# Patient Record
Sex: MALE | Race: WHITE | NOT HISPANIC OR LATINO | URBAN - METROPOLITAN AREA
[De-identification: names, ages, dates, MRNs, and addresses within clinical notes are randomized per-mention and may not be internally consistent; named-entity substitution may affect disease eponyms.]

---

## 2021-12-09 ENCOUNTER — HOSPITAL ENCOUNTER (EMERGENCY)
Facility: HOSPITAL | Age: 21
Discharge: HOME | End: 2021-12-09
Attending: EMERGENCY MEDICINE | Admitting: EMERGENCY MEDICINE

## 2021-12-09 ENCOUNTER — APPOINTMENT (OUTPATIENT)
Dept: GENERAL RADIOLOGY | Facility: HOSPITAL | Age: 21
End: 2021-12-09

## 2021-12-09 VITALS
OXYGEN SATURATION: 99 % | TEMPERATURE: 97.8 F | SYSTOLIC BLOOD PRESSURE: 128 MMHG | DIASTOLIC BLOOD PRESSURE: 74 MMHG | HEART RATE: 68 BPM | RESPIRATION RATE: 18 BRPM

## 2021-12-09 DIAGNOSIS — S23.29XA COSTOCHONDRAL SEPARATION, INITIAL ENCOUNTER: Primary | ICD-10-CM

## 2021-12-09 LAB — QT INTERVAL: 379 MS

## 2021-12-09 PROCEDURE — 71046 X-RAY EXAM CHEST 2 VIEWS: CPT

## 2021-12-09 PROCEDURE — 93010 ELECTROCARDIOGRAM REPORT: CPT | Performed by: INTERNAL MEDICINE

## 2021-12-09 PROCEDURE — 99282 EMERGENCY DEPT VISIT SF MDM: CPT

## 2021-12-09 PROCEDURE — 93005 ELECTROCARDIOGRAM TRACING: CPT

## 2021-12-09 NOTE — ED NOTES
"Pt reports he has had recurrent chest wall pain. Pt reports he is able to stretch and get his \"chest to pop\" which gives him pain relief. Pt reports he feels a \"protrusion\" on his chest that is also relieved when he stretches.      Reji Castellon, RN  12/09/21 [id number]    "

## 2021-12-09 NOTE — ED PROVIDER NOTES
" EMERGENCY DEPARTMENT ENCOUNTER    Room Number:  B04/04  Date of encounter:  12/9/2021  PCP: Provider, No Known  Historian: Patient      I used full protective equipment while examining this patient.  This includes face mask, gloves and protective eyewear.  I washed my hands before entering the room and immediately upon leaving the room      HPI:  Chief Complaint: Chest wall pain  A complete HPI/ROS/PMH/PSH/SH/FH are unobtainable due to: Nothing    Context: Saman Rutherford is a 21 y.o. male who presents to the ED c/o multiple month history of right-sided chest wall pain.  Patient denies any known injury.  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  He describes it as a sharp pain to the right anterior chest.  He states the pain is localized just to the right of the sternum.  He states typically in the morning the area feels tight and he must \"pop\" the area by arching his back.  The pain is then relieved.  Patient states he does this multiple times throughout the day.  He denies any shortness of breath, nausea, vomiting.  He is otherwise healthy and takes no medications.  He is pain does seem to get better with rest.  At times patient has noticed a visible bump to the right side of the sternum.    Review of Medical Records  I reviewed patient's last office visit from 1/6/2021.  Patient seen for Covid testing.    PAST MEDICAL HISTORY  Active Ambulatory Problems     Diagnosis Date Noted   • No Active Ambulatory Problems     Resolved Ambulatory Problems     Diagnosis Date Noted   • No Resolved Ambulatory Problems     No Additional Past Medical History         PAST SURGICAL HISTORY  No past surgical history on file.      FAMILY HISTORY  No family history on file.      SOCIAL HISTORY  Social History     Socioeconomic History   • Marital status: Single         ALLERGIES  Tamiflu [oseltamivir]        REVIEW OF SYSTEMS  All systems reviewed and negative except for those discussed in " HPI.       PHYSICAL EXAM    I have reviewed the triage vital signs and nursing notes.    ED Triage Vitals [12/09/21 1246]   Temp Heart Rate Resp BP SpO2   97.8 °F (36.6 °C) 70 18 -- 99 %      Temp src Heart Rate Source Patient Position BP Location FiO2 (%)   -- -- -- -- --       Physical Exam  GENERAL: Alert, oriented, not distressed  HENT: head atraumatic, no nuchal rigidity  EYES: no scleral icterus, EOMI  CV: regular rhythm, regular rate, no murmur  RESPIRATORY: Mild tenderness to right costochondral margin.  No deformity.  No flail segment.  Lungs clear to auscultation.  ABDOMEN: soft, nontender  MUSCULOSKELETAL: no deformity, FROM, no calf swelling or tenderness  NEURO: alert, moves all extremities, follows commands  SKIN: warm, dry        LAB RESULTS  Recent Results (from the past 24 hour(s))   ECG 12 Lead    Collection Time: 12/09/21 12:53 PM   Result Value Ref Range    QT Interval 379 ms       Ordered the above labs and independently reviewed the results.        RADIOLOGY  XR Chest 2 View    Result Date: 12/9/2021  XR CHEST 2 VW-  12/09/2021  HISTORY: Chest pain.  Heart size is within normal limits. Lungs appear free of acute infiltrates. There is mild thoracic scoliosis. Bones and soft tissues are otherwise unremarkable.      No acute process.  This report was finalized on 12/9/2021 2:22 PM by Dr. Jarett Stoddard M.D.        I ordered the above noted radiological studies. Reviewed by me and discussed with radiologist.  See dictation for official radiology interpretation.    PROGRESS, DATA ANALYSIS, CONSULTS, AND MEDICAL DECISION MAKING    All labs have been independently reviewed by me.  All radiology studies have been reviewed by me and discussed with radiologist dictating the report.   EKG's independently viewed and interpreted by me.  Discussion below represents my analysis of pertinent findings related to patient's condition, differential diagnosis, treatment plan and final disposition.    I have  discussed case with Dr. Brar, emergency room physician.  He has performed his own bedside examination and agrees with treatment plan.    ED Course as of 12/09/21 1937   Thu Dec 09, 2021   0745 Patient presents with multiple month history of intermittent right-sided chest wall pain.  The pain is worse with movement.  Suspect costochondral separation.  Differential diagnoses include but not limited to costochondral separation, PE, pneumothorax. [EE]   1412 Chest x-ray interpreted myself shows no acute infiltrate or effusion. [EE]   1412 EKG interpreted myself.  Time 1253.  Sinus rhythm, 62 bpm.  Normal P/FELA.  QRS normal normal axis.  No ST abnormalities.  No previous for comparison. [EE]   1439 Radiologist confirms no acute abnormality seen on chest x-ray.  I suspect this is likely a costochondral separation.  Will treat with rest, NSAIDs.  Patient in agreement with treatment plan. [EE]      ED Course User Index  [EE] Prosper Triana PA       AS OF 19:37 EST VITALS:    BP - 128/74  HR - 68  TEMP - 97.8 °F (36.6 °C)  O2 SATS - 99%        DIAGNOSIS  Final diagnoses:   Costochondral separation, initial encounter         DISPOSITION  Discharged           Prosper Triana PA  12/09/21 1937

## 2021-12-09 NOTE — ED NOTES
"Patient states he has had a \"chest pop\" for multiple months. Patient states when he arches his back he pops his chest and has relief. 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 States when he rolls his shoulders forward it makes the pain worse.      Lesly Garcia RN  12/09/21 1373    "

## 2021-12-13 NOTE — ED PROVIDER NOTES
MD ATTESTATION NOTE    The JENNIFER and I have discussed this patient's history, physical exam, and treatment plan.  I have reviewed the documentation and personally had a face to face interaction with the patient. I affirm the documentation and agree with the treatment and plan.  The attached note describes my personal findings.    Young healthy gentleman who presents with right-sided chest wall pain.  He did sustain trauma to the right chest wall and his juGroup[employer] class.  He is not having any shortness of breath, and he is continue to work out but does have increased pain in the right anterior lateral wall.  He has no significant distress, oxygenating and breathing fine and x-ray shows no rib fractures or pneumothorax.  Patient safe for discharge home     Yoseph Brar MD  12/13/21 5811